# Patient Record
Sex: MALE | Race: WHITE | NOT HISPANIC OR LATINO | Employment: FULL TIME | URBAN - METROPOLITAN AREA
[De-identification: names, ages, dates, MRNs, and addresses within clinical notes are randomized per-mention and may not be internally consistent; named-entity substitution may affect disease eponyms.]

---

## 2022-03-15 ENCOUNTER — OFFICE VISIT (OUTPATIENT)
Dept: RHEUMATOLOGY | Facility: CLINIC | Age: 27
End: 2022-03-15
Payer: COMMERCIAL

## 2022-03-15 VITALS
DIASTOLIC BLOOD PRESSURE: 70 MMHG | HEIGHT: 74 IN | BODY MASS INDEX: 24.34 KG/M2 | HEART RATE: 63 BPM | SYSTOLIC BLOOD PRESSURE: 108 MMHG | WEIGHT: 189.63 LBS

## 2022-03-15 DIAGNOSIS — M06.9 RHEUMATOID ARTHRITIS INVOLVING MULTIPLE SITES, UNSPECIFIED WHETHER RHEUMATOID FACTOR PRESENT: Primary | ICD-10-CM

## 2022-03-15 PROCEDURE — 99999 PR PBB SHADOW E&M-NEW PATIENT-LVL III: ICD-10-PCS | Mod: PBBFAC,,, | Performed by: INTERNAL MEDICINE

## 2022-03-15 PROCEDURE — 99999 PR PBB SHADOW E&M-NEW PATIENT-LVL III: CPT | Mod: PBBFAC,,, | Performed by: INTERNAL MEDICINE

## 2022-03-15 PROCEDURE — 1159F MED LIST DOCD IN RCRD: CPT | Mod: CPTII,S$GLB,, | Performed by: INTERNAL MEDICINE

## 2022-03-15 PROCEDURE — 1159F PR MEDICATION LIST DOCUMENTED IN MEDICAL RECORD: ICD-10-PCS | Mod: CPTII,S$GLB,, | Performed by: INTERNAL MEDICINE

## 2022-03-15 PROCEDURE — 3078F DIAST BP <80 MM HG: CPT | Mod: CPTII,S$GLB,, | Performed by: INTERNAL MEDICINE

## 2022-03-15 PROCEDURE — 3078F PR MOST RECENT DIASTOLIC BLOOD PRESSURE < 80 MM HG: ICD-10-PCS | Mod: CPTII,S$GLB,, | Performed by: INTERNAL MEDICINE

## 2022-03-15 PROCEDURE — 99205 PR OFFICE/OUTPT VISIT, NEW, LEVL V, 60-74 MIN: ICD-10-PCS | Mod: S$GLB,,, | Performed by: INTERNAL MEDICINE

## 2022-03-15 PROCEDURE — 3008F PR BODY MASS INDEX (BMI) DOCUMENTED: ICD-10-PCS | Mod: CPTII,S$GLB,, | Performed by: INTERNAL MEDICINE

## 2022-03-15 PROCEDURE — 3008F BODY MASS INDEX DOCD: CPT | Mod: CPTII,S$GLB,, | Performed by: INTERNAL MEDICINE

## 2022-03-15 PROCEDURE — 3074F SYST BP LT 130 MM HG: CPT | Mod: CPTII,S$GLB,, | Performed by: INTERNAL MEDICINE

## 2022-03-15 PROCEDURE — 1160F PR REVIEW ALL MEDS BY PRESCRIBER/CLIN PHARMACIST DOCUMENTED: ICD-10-PCS | Mod: CPTII,S$GLB,, | Performed by: INTERNAL MEDICINE

## 2022-03-15 PROCEDURE — 3074F PR MOST RECENT SYSTOLIC BLOOD PRESSURE < 130 MM HG: ICD-10-PCS | Mod: CPTII,S$GLB,, | Performed by: INTERNAL MEDICINE

## 2022-03-15 PROCEDURE — 99205 OFFICE O/P NEW HI 60 MIN: CPT | Mod: S$GLB,,, | Performed by: INTERNAL MEDICINE

## 2022-03-15 PROCEDURE — 1160F RVW MEDS BY RX/DR IN RCRD: CPT | Mod: CPTII,S$GLB,, | Performed by: INTERNAL MEDICINE

## 2022-03-15 ASSESSMENT — ROUTINE ASSESSMENT OF PATIENT INDEX DATA (RAPID3)
PATIENT GLOBAL ASSESSMENT SCORE: 0
MDHAQ FUNCTION SCORE: 0
AM STIFFNESS SCORE: 0, NO
FATIGUE SCORE: 0
PAIN SCORE: 0
PSYCHOLOGICAL DISTRESS SCORE: 0
TOTAL RAPID3 SCORE: 0

## 2022-03-15 NOTE — PATIENT INSTRUCTIONS
To whomosever it may concern    Dear /,    Mr.Tyler Paul,  1995, is a patient of ours at the rheumatology clinic at [facility]. He does have a diagnosis of Rheumatoid arthritis, I donot have the records from the previous rheumatologist but I did examine him today and see that he does have active Rheumatoid arthritis. This however should not limit his activities since he has been working for 12 hours a day for a while now and uses hands all the time with no pain or limitations. He is however asked to establish care with a rheumatologist in [place],start a disease modifying antirheumatic drug for his own benefit.    Please donot hesitate to contact us if any questions  Dr.Chandana Crystal  [facility]

## 2022-03-15 NOTE — PROGRESS NOTES
Chief Complaint   Patient presents with    Disease Management       Patient was referred by : self    History of presenting illness    26 year old white male with Rheumatoid arthritis - diagnosed 3 years ago  Initial symptoms : fatigue, limited ROM and soreness  Joints : both hands   Medications :  SSZ - he took it for 3 months ,symptoms resolved  Never followed back    Currently    No pain on a day to day basis  Soreness only when cold in the fingers  No problems with functioning  EMS : 30 minutes  He has nodules in the fingers  Had deformities in the left hand last one and half year  He takes ibuprofen as needed     Past history : Rheumatoid arthritis    Family history : Mom- RA    Social history : not a smoker,alcohol user,recreational drug use    He lives in [place]   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, manual labor,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,he puts engines,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 day with no problems     Review of Systems      No skin rashes,malar rash,photosensitivity   No telangiectasias   No calcinosis   No psoriasis   No patchy alopecia   No oral and nasal ulcers   No dry eyes and dry mouth   No pleurisy or any cardiopulmonary complaints   No dysphagia,diplopia and dysphonia and muscle weakness   No n/v/d/c   No acid reflux+   No raynaud's+   No digital ulcers   No cytopenias   No renal issues   No blood clots   No fever,chills,night sweats,weight loss and loss of appetite   No new onset headaches   No recurrent conjunctivitis or uveitis or scleritis or episcleritis   No chronic or bloody diarrhea with no u colitis or crohn's /inflammatory bowel disease   No penile and urethral  d/c/STDs/no ulcers   No unexplained lymphadenopathy,parotitis   No seizures,strokes,psychosis  No sclerodactyly  No puffy hands  No perioral tightness         Hammer toes left foot  Left hand ulnar deviation noted    Cant make a complete fist right hand  Left hand- 3rd finger -limits his fist  making ability    Physical Exam   Constitutional: He is oriented to person, place, and time. No distress.   HENT:   Head: Normocephalic.   Mouth/Throat: Oropharynx is clear and moist.   Eyes: Pupils are equal, round, and reactive to light. Conjunctivae are normal. Right eye exhibits no discharge. Left eye exhibits no discharge. No scleral icterus.   Neck: No thyromegaly present.   Cardiovascular: Normal rate, regular rhythm and normal heart sounds.   Pulmonary/Chest: Effort normal and breath sounds normal. No stridor.   Abdominal: Soft. Bowel sounds are normal.   Musculoskeletal:         General: Normal range of motion.      Cervical back: Normal range of motion.   Lymphadenopathy:     He has no cervical adenopathy.   Neurological: He is alert and oriented to person, place, and time.   Skin: Skin is warm. No rash noted. He is not diaphoretic.   Psychiatric: Affect and judgment normal.       Laboratory abnormalities- none available     Assessment     26 year old white male diagnosed with Rheumatoid arthritis 3 years ago when he had presented to a rheumatologist with symptoms of pain and limited range of motion in the hands   He only needed sulfasalazine for 3 months after which he has absolutely no symptoms on a regular basis,however when the weather changes he does experience some soreness in the fingers  He has morning stiffness for 30 minutes everyday    On examination he has 0 tender joints but does have 5 swollen joints  He has left hand ulnar deviation and cannot make a complete fist with both hands,right > left  He also has left foot deformities  He has no psoriasis   Currently he only needs ibuprofen 4 times a month     He lives in [place]   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 Rheumatoid arthritis involving multiple sites, unspecified whether rheumatoid factor present          Ordered labs /xrays        New problem     Plan    No activity restriction needed and he can start working soon since he already works a heavy duty job for 12 hours a day and uses his hands all the time with no pain,has no difficulties in functioning.    But I suggest that he does    RF,CCP,ESR,CRP,HLAB27  Pre dmard panel    Xrays- hands,feet,cervical spine,knees    Asap he needs to start a disease modifying antirheumatic drug to prevent the progression of the disease preferably a biologic DMARD- considering how aggressive his disease looks with all the deformities       Tariq was seen today for disease management.    Diagnoses and all orders for this visit:    Rheumatoid arthritis involving multiple sites, unspecified whether rheumatoid factor present  -     Rheumatoid Factor; Future  -     Cyclic Citrullinated Peptide Antibody, IgG; Future  -     C-Reactive Protein; Future  -     HLA B27 Antigen; Future  -     Hepatitis B Surface Ab, Qualitative; Future  -     Hepatitis B Core Antibody, Total; Future  -     Hepatitis B Surface Antigen; Future  -     HIV 1/2 Ag/Ab (4th Gen); Future  -     RPR; Future  -     Hepatitis C Antibody; Future  -     Strongyloides IgG Antibodies; Future  -     Sedimentation rate, automated; Future  -     Rheumatoid Factor  -     Cyclic Citrullinated Peptide Antibody, IgG  -     C-Reactive Protein  -     HLA B27 Antigen  -     Hepatitis B Surface Ab, Qualitative  -     Hepatitis B Core Antibody, Total  -     Hepatitis B Surface Antigen  -     HIV 1/2 Ag/Ab (4th Gen)  -     RPR  -     Hepatitis C Antibody  -     Strongyloides IgG Antibodies  -     Sedimentation rate, automated

## 2022-03-15 NOTE — PROGRESS NOTES
Rapid3 Question Responses and Scores 3/15/2022   MDHAQ Score 0   Psychologic Score 0   Pain Score 0   When you awakened in the morning OVER THE LAST WEEK, did you feel stiff? No   Fatigue Score 0   Global Health Score 0   RAPID3 Score 0

## 2022-03-31 ENCOUNTER — DOCUMENTATION ONLY (OUTPATIENT)
Dept: RHEUMATOLOGY | Facility: CLINIC | Age: 27
End: 2022-03-31
Payer: COMMERCIAL

## 2022-03-31 NOTE — PROGRESS NOTES
Dear Yaneli,     Mr.Tyler Paul,  1995, is a patient of ours at the rheumatology clinic at [facility]. He does have a diagnosis of Rheumatoid arthritis, I donot have the records from the previous rheumatologist but I did examine him today and see that he does have active Rheumatoid arthritis. This however should not limit his activities since he has been working for 12 hours a day for a while now and uses hands all the time with no pain or limitations. He is however asked to establish care with a rheumatologist in [place],start a disease modifying antirheumatic drug for his own benefit.     Please donot hesitate to contact us if any questions  Dr.Chandana Mcclainhy [facility]

## 2022-04-01 ENCOUNTER — TELEPHONE (OUTPATIENT)
Dept: RHEUMATOLOGY | Facility: CLINIC | Age: 27
End: 2022-04-01
Payer: COMMERCIAL

## 2022-04-01 NOTE — TELEPHONE ENCOUNTER
Spoke with the patient and let him know that I forwarded the message to Dr Crystal and faxed the last office note again

## 2022-04-01 NOTE — TELEPHONE ENCOUNTER
----- Message from Pedro Cabral sent at 4/1/2022  1:43 PM CDT -----  Regarding: Pt would need the letter resent    The Pt states that [facility] hasn't recvd the Letter Of Clearance yet.    Please send it to :    Fax # [phone] (attn [place]avian at [facility])    [facility] Office # [phone]